# Patient Record
Sex: MALE | Race: WHITE | NOT HISPANIC OR LATINO | URBAN - METROPOLITAN AREA
[De-identification: names, ages, dates, MRNs, and addresses within clinical notes are randomized per-mention and may not be internally consistent; named-entity substitution may affect disease eponyms.]

---

## 2017-08-07 ENCOUNTER — RECORDS - HEALTHEAST (OUTPATIENT)
Dept: LAB | Facility: CLINIC | Age: 82
End: 2017-08-07

## 2017-08-07 LAB
CHOLEST SERPL-MCNC: 141 MG/DL
FASTING STATUS PATIENT QL REPORTED: ABNORMAL
HDLC SERPL-MCNC: 29 MG/DL
LDLC SERPL CALC-MCNC: 87 MG/DL
TRIGL SERPL-MCNC: 123 MG/DL

## 2017-10-10 ENCOUNTER — RECORDS - HEALTHEAST (OUTPATIENT)
Dept: LAB | Facility: CLINIC | Age: 82
End: 2017-10-10

## 2017-10-10 LAB
CREAT SERPL-MCNC: 1.51 MG/DL (ref 0.7–1.3)
GFR SERPL CREATININE-BSD FRML MDRD: 44 ML/MIN/1.73M2

## 2018-02-09 ENCOUNTER — RECORDS - HEALTHEAST (OUTPATIENT)
Dept: LAB | Facility: CLINIC | Age: 83
End: 2018-02-09

## 2018-02-09 LAB
ALBUMIN SERPL-MCNC: 2.1 G/DL (ref 3.5–5)
ALP SERPL-CCNC: 97 U/L (ref 45–120)
ALT SERPL W P-5'-P-CCNC: <9 U/L (ref 0–45)
ANION GAP SERPL CALCULATED.3IONS-SCNC: 5 MMOL/L (ref 5–18)
AST SERPL W P-5'-P-CCNC: 10 U/L (ref 0–40)
BASOPHILS # BLD AUTO: 0 THOU/UL (ref 0–0.2)
BASOPHILS NFR BLD AUTO: 0 % (ref 0–2)
BILIRUB SERPL-MCNC: 0.3 MG/DL (ref 0–1)
BUN SERPL-MCNC: 39 MG/DL (ref 8–28)
CALCIUM SERPL-MCNC: 8 MG/DL (ref 8.5–10.5)
CHLORIDE BLD-SCNC: 106 MMOL/L (ref 98–107)
CHOLEST SERPL-MCNC: 116 MG/DL
CO2 SERPL-SCNC: 32 MMOL/L (ref 22–31)
CREAT SERPL-MCNC: 1.45 MG/DL (ref 0.7–1.3)
EOSINOPHIL # BLD AUTO: 0.6 THOU/UL (ref 0–0.4)
EOSINOPHIL NFR BLD AUTO: 6 % (ref 0–6)
ERYTHROCYTE [DISTWIDTH] IN BLOOD BY AUTOMATED COUNT: 12.7 % (ref 11–14.5)
FASTING STATUS PATIENT QL REPORTED: ABNORMAL
GFR SERPL CREATININE-BSD FRML MDRD: 46 ML/MIN/1.73M2
GLUCOSE BLD-MCNC: 101 MG/DL (ref 70–125)
HCT VFR BLD AUTO: 35.9 % (ref 40–54)
HDLC SERPL-MCNC: 27 MG/DL
HGB BLD-MCNC: 10.9 G/DL (ref 14–18)
LDLC SERPL CALC-MCNC: 70 MG/DL
LYMPHOCYTES # BLD AUTO: 3.3 THOU/UL (ref 0.8–4.4)
LYMPHOCYTES NFR BLD AUTO: 32 % (ref 20–40)
MCH RBC QN AUTO: 31.3 PG (ref 27–34)
MCHC RBC AUTO-ENTMCNC: 30.4 G/DL (ref 32–36)
MCV RBC AUTO: 103 FL (ref 80–100)
MONOCYTES # BLD AUTO: 0.6 THOU/UL (ref 0–0.9)
MONOCYTES NFR BLD AUTO: 6 % (ref 2–10)
NEUTROPHILS # BLD AUTO: 5.8 THOU/UL (ref 2–7.7)
NEUTROPHILS NFR BLD AUTO: 56 % (ref 50–70)
PLATELET # BLD AUTO: 213 THOU/UL (ref 140–440)
PMV BLD AUTO: 8.9 FL (ref 8.5–12.5)
POTASSIUM BLD-SCNC: 4.4 MMOL/L (ref 3.5–5)
PROT SERPL-MCNC: 5.1 G/DL (ref 6–8)
RBC # BLD AUTO: 3.48 MILL/UL (ref 4.4–6.2)
SODIUM SERPL-SCNC: 143 MMOL/L (ref 136–145)
TRIGL SERPL-MCNC: 93 MG/DL
TSH SERPL DL<=0.005 MIU/L-ACNC: 5.22 UIU/ML (ref 0.3–5)
WBC: 10.4 THOU/UL (ref 4–11)

## 2018-08-01 ENCOUNTER — RECORDS - HEALTHEAST (OUTPATIENT)
Dept: LAB | Facility: CLINIC | Age: 83
End: 2018-08-01

## 2018-08-02 LAB
ALBUMIN SERPL-MCNC: 2.5 G/DL (ref 3.5–5)
ALP SERPL-CCNC: 112 U/L (ref 45–120)
ALT SERPL W P-5'-P-CCNC: 12 U/L (ref 0–45)
ANION GAP SERPL CALCULATED.3IONS-SCNC: 7 MMOL/L (ref 5–18)
AST SERPL W P-5'-P-CCNC: 14 U/L (ref 0–40)
BASOPHILS # BLD AUTO: 0.1 THOU/UL (ref 0–0.2)
BASOPHILS NFR BLD AUTO: 1 % (ref 0–2)
BILIRUB SERPL-MCNC: 0.3 MG/DL (ref 0–1)
BUN SERPL-MCNC: 54 MG/DL (ref 8–28)
CALCIUM SERPL-MCNC: 8.2 MG/DL (ref 8.5–10.5)
CHLORIDE BLD-SCNC: 102 MMOL/L (ref 98–107)
CHOLEST SERPL-MCNC: 175 MG/DL
CO2 SERPL-SCNC: 30 MMOL/L (ref 22–31)
CREAT SERPL-MCNC: 1.78 MG/DL (ref 0.7–1.3)
EOSINOPHIL # BLD AUTO: 0.7 THOU/UL (ref 0–0.4)
EOSINOPHIL NFR BLD AUTO: 5 % (ref 0–6)
ERYTHROCYTE [DISTWIDTH] IN BLOOD BY AUTOMATED COUNT: 12.6 % (ref 11–14.5)
FASTING STATUS PATIENT QL REPORTED: ABNORMAL
GFR SERPL CREATININE-BSD FRML MDRD: 36 ML/MIN/1.73M2
GLUCOSE BLD-MCNC: 178 MG/DL (ref 70–125)
HCT VFR BLD AUTO: 39.3 % (ref 40–54)
HDLC SERPL-MCNC: 34 MG/DL
HGB BLD-MCNC: 12.1 G/DL (ref 14–18)
LDLC SERPL CALC-MCNC: 114 MG/DL
LYMPHOCYTES # BLD AUTO: 4.5 THOU/UL (ref 0.8–4.4)
LYMPHOCYTES NFR BLD AUTO: 33 % (ref 20–40)
MCH RBC QN AUTO: 31.7 PG (ref 27–34)
MCHC RBC AUTO-ENTMCNC: 30.8 G/DL (ref 32–36)
MCV RBC AUTO: 103 FL (ref 80–100)
MONOCYTES # BLD AUTO: 0.5 THOU/UL (ref 0–0.9)
MONOCYTES NFR BLD AUTO: 4 % (ref 2–10)
NEUTROPHILS # BLD AUTO: 7.7 THOU/UL (ref 2–7.7)
NEUTROPHILS NFR BLD AUTO: 58 % (ref 50–70)
PLATELET # BLD AUTO: 259 THOU/UL (ref 140–440)
PMV BLD AUTO: 9.1 FL (ref 8.5–12.5)
POTASSIUM BLD-SCNC: 4.5 MMOL/L (ref 3.5–5)
PROT SERPL-MCNC: 5.8 G/DL (ref 6–8)
RBC # BLD AUTO: 3.82 MILL/UL (ref 4.4–6.2)
SODIUM SERPL-SCNC: 139 MMOL/L (ref 136–145)
TRIGL SERPL-MCNC: 133 MG/DL
TSH SERPL DL<=0.005 MIU/L-ACNC: 6.43 UIU/ML (ref 0.3–5)
WBC: 13.4 THOU/UL (ref 4–11)

## 2018-08-07 ENCOUNTER — RECORDS - HEALTHEAST (OUTPATIENT)
Dept: LAB | Facility: CLINIC | Age: 83
End: 2018-08-07

## 2018-08-08 LAB
ALBUMIN SERPL-MCNC: 2.5 G/DL (ref 3.5–5)
ALP SERPL-CCNC: 112 U/L (ref 45–120)
ALT SERPL W P-5'-P-CCNC: 12 U/L (ref 0–45)
ANION GAP SERPL CALCULATED.3IONS-SCNC: 12 MMOL/L (ref 5–18)
AST SERPL W P-5'-P-CCNC: 11 U/L (ref 0–40)
BASOPHILS # BLD AUTO: 0.1 THOU/UL (ref 0–0.2)
BASOPHILS NFR BLD AUTO: 1 % (ref 0–2)
BILIRUB SERPL-MCNC: 0.3 MG/DL (ref 0–1)
BUN SERPL-MCNC: 53 MG/DL (ref 8–28)
CALCIUM SERPL-MCNC: 7.9 MG/DL (ref 8.5–10.5)
CHLORIDE BLD-SCNC: 105 MMOL/L (ref 98–107)
CHOLEST SERPL-MCNC: 180 MG/DL
CO2 SERPL-SCNC: 24 MMOL/L (ref 22–31)
CREAT SERPL-MCNC: 1.65 MG/DL (ref 0.7–1.3)
EOSINOPHIL # BLD AUTO: 0.6 THOU/UL (ref 0–0.4)
EOSINOPHIL NFR BLD AUTO: 5 % (ref 0–6)
ERYTHROCYTE [DISTWIDTH] IN BLOOD BY AUTOMATED COUNT: 12.9 % (ref 11–14.5)
FASTING STATUS PATIENT QL REPORTED: ABNORMAL
FERRITIN SERPL-MCNC: 101 NG/ML (ref 27–300)
GFR SERPL CREATININE-BSD FRML MDRD: 39 ML/MIN/1.73M2
GLUCOSE BLD-MCNC: 174 MG/DL (ref 70–125)
HCT VFR BLD AUTO: 38 % (ref 40–54)
HDLC SERPL-MCNC: 34 MG/DL
HGB BLD-MCNC: 12.1 G/DL (ref 14–18)
LDLC SERPL CALC-MCNC: 121 MG/DL
LYMPHOCYTES # BLD AUTO: 3.4 THOU/UL (ref 0.8–4.4)
LYMPHOCYTES NFR BLD AUTO: 28 % (ref 20–40)
MCH RBC QN AUTO: 32.7 PG (ref 27–34)
MCHC RBC AUTO-ENTMCNC: 31.8 G/DL (ref 32–36)
MCV RBC AUTO: 103 FL (ref 80–100)
MONOCYTES # BLD AUTO: 0.5 THOU/UL (ref 0–0.9)
MONOCYTES NFR BLD AUTO: 4 % (ref 2–10)
NEUTROPHILS # BLD AUTO: 7.4 THOU/UL (ref 2–7.7)
NEUTROPHILS NFR BLD AUTO: 62 % (ref 50–70)
PLATELET # BLD AUTO: 203 THOU/UL (ref 140–440)
PMV BLD AUTO: 10.8 FL (ref 8.5–12.5)
POTASSIUM BLD-SCNC: 4.4 MMOL/L (ref 3.5–5)
PROT SERPL-MCNC: 5.3 G/DL (ref 6–8)
RBC # BLD AUTO: 3.7 MILL/UL (ref 4.4–6.2)
SODIUM SERPL-SCNC: 141 MMOL/L (ref 136–145)
TRIGL SERPL-MCNC: 123 MG/DL
TSH SERPL DL<=0.005 MIU/L-ACNC: 6.53 UIU/ML (ref 0.3–5)
WBC: 12 THOU/UL (ref 4–11)

## 2018-08-15 ENCOUNTER — RECORDS - HEALTHEAST (OUTPATIENT)
Dept: LAB | Facility: CLINIC | Age: 83
End: 2018-08-15

## 2018-08-16 LAB — TSH SERPL DL<=0.005 MIU/L-ACNC: 5.45 UIU/ML (ref 0.3–5)

## 2018-08-20 ENCOUNTER — RECORDS - HEALTHEAST (OUTPATIENT)
Dept: LAB | Facility: CLINIC | Age: 83
End: 2018-08-20

## 2018-08-21 ENCOUNTER — RECORDS - HEALTHEAST (OUTPATIENT)
Dept: LAB | Facility: CLINIC | Age: 83
End: 2018-08-21

## 2018-08-21 LAB
ALBUMIN SERPL-MCNC: 2.5 G/DL (ref 3.5–5)
ANION GAP SERPL CALCULATED.3IONS-SCNC: 6 MMOL/L (ref 5–18)
BUN SERPL-MCNC: 40 MG/DL (ref 8–28)
CALCIUM SERPL-MCNC: 8.7 MG/DL (ref 8.5–10.5)
CHLORIDE BLD-SCNC: 102 MMOL/L (ref 98–107)
CO2 SERPL-SCNC: 33 MMOL/L (ref 22–31)
CREAT SERPL-MCNC: 1.71 MG/DL (ref 0.7–1.3)
GFR SERPL CREATININE-BSD FRML MDRD: 38 ML/MIN/1.73M2
GLUCOSE BLD-MCNC: 113 MG/DL (ref 70–125)
HBA1C MFR BLD: 5.9 % (ref 4.2–6.1)
PHOSPHATE SERPL-MCNC: 3 MG/DL (ref 2.5–4.5)
POTASSIUM BLD-SCNC: 4.5 MMOL/L (ref 3.5–5)
SODIUM SERPL-SCNC: 141 MMOL/L (ref 136–145)
THYROID PEROXIDASE ANTIBODIES - HISTORICAL: <3 IU/ML (ref 0–5.6)
URATE SERPL-MCNC: 2.8 MG/DL (ref 3–8)

## 2018-09-04 ENCOUNTER — RECORDS - HEALTHEAST (OUTPATIENT)
Dept: LAB | Facility: CLINIC | Age: 83
End: 2018-09-04

## 2018-09-04 LAB
T3 SERPL-MCNC: 47 NG/DL (ref 45–175)
T3FREE SERPL-MCNC: 1.5 PG/ML (ref 1.9–3.9)
T4 FREE SERPL-MCNC: 0.8 NG/DL (ref 0.7–1.8)
TSH SERPL DL<=0.005 MIU/L-ACNC: 6.5 UIU/ML (ref 0.3–5)

## 2018-10-29 ENCOUNTER — HOME CARE/HOSPICE - HEALTHEAST (OUTPATIENT)
Dept: HOSPICE | Facility: HOSPICE | Age: 83
End: 2018-10-29

## 2018-10-30 ENCOUNTER — AMBULATORY - HEALTHEAST (OUTPATIENT)
Dept: OTHER | Facility: CLINIC | Age: 83
End: 2018-10-30

## 2018-10-31 ENCOUNTER — RECORDS - HEALTHEAST (OUTPATIENT)
Dept: ADMINISTRATIVE | Facility: OTHER | Age: 83
End: 2018-10-31

## 2018-10-31 ENCOUNTER — HOME CARE/HOSPICE - HEALTHEAST (OUTPATIENT)
Dept: HOSPICE | Facility: HOSPICE | Age: 83
End: 2018-10-31

## 2018-11-01 ENCOUNTER — HOME CARE/HOSPICE - HEALTHEAST (OUTPATIENT)
Dept: HOSPICE | Facility: HOSPICE | Age: 83
End: 2018-11-01

## 2018-11-01 ENCOUNTER — AMBULATORY - HEALTHEAST (OUTPATIENT)
Dept: HOSPICE | Facility: HOSPICE | Age: 83
End: 2018-11-01

## 2018-11-01 ASSESSMENT — MIFFLIN-ST. JEOR: SCORE: 1782.98

## 2018-11-02 ENCOUNTER — HOME CARE/HOSPICE - HEALTHEAST (OUTPATIENT)
Dept: HOSPICE | Facility: HOSPICE | Age: 83
End: 2018-11-02

## 2018-11-05 ENCOUNTER — HOME CARE/HOSPICE - HEALTHEAST (OUTPATIENT)
Dept: HOSPICE | Facility: HOSPICE | Age: 83
End: 2018-11-05

## 2018-11-06 ENCOUNTER — HOME CARE/HOSPICE - HEALTHEAST (OUTPATIENT)
Dept: HOSPICE | Facility: HOSPICE | Age: 83
End: 2018-11-06

## 2018-11-07 ENCOUNTER — HOME CARE/HOSPICE - HEALTHEAST (OUTPATIENT)
Dept: HOSPICE | Facility: HOSPICE | Age: 83
End: 2018-11-07

## 2018-11-08 ENCOUNTER — HOME CARE/HOSPICE - HEALTHEAST (OUTPATIENT)
Dept: HOSPICE | Facility: HOSPICE | Age: 83
End: 2018-11-08

## 2018-11-12 ENCOUNTER — HOME CARE/HOSPICE - HEALTHEAST (OUTPATIENT)
Dept: HOSPICE | Facility: HOSPICE | Age: 83
End: 2018-11-12

## 2018-11-13 ENCOUNTER — HOME CARE/HOSPICE - HEALTHEAST (OUTPATIENT)
Dept: HOSPICE | Facility: HOSPICE | Age: 83
End: 2018-11-13

## 2018-11-13 ENCOUNTER — AMBULATORY - HEALTHEAST (OUTPATIENT)
Dept: HOSPICE | Facility: HOSPICE | Age: 83
End: 2018-11-13

## 2018-11-13 ENCOUNTER — AMBULATORY - HEALTHEAST (OUTPATIENT)
Dept: FAMILY MEDICINE | Facility: CLINIC | Age: 83
End: 2018-11-13

## 2018-11-14 ENCOUNTER — RECORDS - HEALTHEAST (OUTPATIENT)
Dept: LAB | Facility: CLINIC | Age: 83
End: 2018-11-14

## 2018-11-14 ENCOUNTER — HOME CARE/HOSPICE - HEALTHEAST (OUTPATIENT)
Dept: HOSPICE | Facility: HOSPICE | Age: 83
End: 2018-11-14

## 2018-11-14 LAB — TSH SERPL DL<=0.005 MIU/L-ACNC: 1.78 UIU/ML (ref 0.3–5)

## 2018-11-15 ENCOUNTER — HOME CARE/HOSPICE - HEALTHEAST (OUTPATIENT)
Dept: HOSPICE | Facility: HOSPICE | Age: 83
End: 2018-11-15

## 2018-11-16 ENCOUNTER — HOME CARE/HOSPICE - HEALTHEAST (OUTPATIENT)
Dept: HOSPICE | Facility: HOSPICE | Age: 83
End: 2018-11-16

## 2018-11-19 ENCOUNTER — HOME CARE/HOSPICE - HEALTHEAST (OUTPATIENT)
Dept: HOSPICE | Facility: HOSPICE | Age: 83
End: 2018-11-19

## 2018-11-19 ENCOUNTER — AMBULATORY - HEALTHEAST (OUTPATIENT)
Dept: HOSPICE | Facility: HOSPICE | Age: 83
End: 2018-11-19

## 2018-11-20 ENCOUNTER — HOME CARE/HOSPICE - HEALTHEAST (OUTPATIENT)
Dept: HOSPICE | Facility: HOSPICE | Age: 83
End: 2018-11-20

## 2018-11-21 ENCOUNTER — HOME CARE/HOSPICE - HEALTHEAST (OUTPATIENT)
Dept: HOSPICE | Facility: HOSPICE | Age: 83
End: 2018-11-21

## 2018-11-26 ENCOUNTER — HOME CARE/HOSPICE - HEALTHEAST (OUTPATIENT)
Dept: HOSPICE | Facility: HOSPICE | Age: 83
End: 2018-11-26

## 2018-11-27 ENCOUNTER — AMBULATORY - HEALTHEAST (OUTPATIENT)
Dept: HOSPICE | Facility: HOSPICE | Age: 83
End: 2018-11-27

## 2018-11-28 ENCOUNTER — HOME CARE/HOSPICE - HEALTHEAST (OUTPATIENT)
Dept: HOSPICE | Facility: HOSPICE | Age: 83
End: 2018-11-28

## 2018-11-30 ENCOUNTER — HOME CARE/HOSPICE - HEALTHEAST (OUTPATIENT)
Dept: HOSPICE | Facility: HOSPICE | Age: 83
End: 2018-11-30

## 2018-12-03 ENCOUNTER — HOME CARE/HOSPICE - HEALTHEAST (OUTPATIENT)
Dept: HOSPICE | Facility: HOSPICE | Age: 83
End: 2018-12-03

## 2018-12-06 ENCOUNTER — HOME CARE/HOSPICE - HEALTHEAST (OUTPATIENT)
Dept: HOSPICE | Facility: HOSPICE | Age: 83
End: 2018-12-06

## 2018-12-11 ENCOUNTER — AMBULATORY - HEALTHEAST (OUTPATIENT)
Dept: HOSPICE | Facility: HOSPICE | Age: 83
End: 2018-12-11

## 2018-12-12 ENCOUNTER — HOME CARE/HOSPICE - HEALTHEAST (OUTPATIENT)
Dept: HOSPICE | Facility: HOSPICE | Age: 83
End: 2018-12-12

## 2018-12-14 ENCOUNTER — HOME CARE/HOSPICE - HEALTHEAST (OUTPATIENT)
Dept: HOSPICE | Facility: HOSPICE | Age: 83
End: 2018-12-14

## 2018-12-16 ENCOUNTER — HOME CARE/HOSPICE - HEALTHEAST (OUTPATIENT)
Dept: HOSPICE | Facility: HOSPICE | Age: 83
End: 2018-12-16

## 2018-12-18 ENCOUNTER — HOME CARE/HOSPICE - HEALTHEAST (OUTPATIENT)
Dept: HOSPICE | Facility: HOSPICE | Age: 83
End: 2018-12-18

## 2018-12-19 ENCOUNTER — HOME CARE/HOSPICE - HEALTHEAST (OUTPATIENT)
Dept: HOSPICE | Facility: HOSPICE | Age: 83
End: 2018-12-19

## 2018-12-26 ENCOUNTER — AMBULATORY - HEALTHEAST (OUTPATIENT)
Dept: HOSPICE | Facility: HOSPICE | Age: 83
End: 2018-12-26

## 2018-12-26 ENCOUNTER — HOME CARE/HOSPICE - HEALTHEAST (OUTPATIENT)
Dept: HOSPICE | Facility: HOSPICE | Age: 83
End: 2018-12-26

## 2018-12-27 ENCOUNTER — HOME CARE/HOSPICE - HEALTHEAST (OUTPATIENT)
Dept: HOSPICE | Facility: HOSPICE | Age: 83
End: 2018-12-27

## 2019-01-01 ENCOUNTER — HOME CARE/HOSPICE - HEALTHEAST (OUTPATIENT)
Dept: HOSPICE | Facility: HOSPICE | Age: 84
End: 2019-01-01

## 2019-01-01 ENCOUNTER — AMBULATORY - HEALTHEAST (OUTPATIENT)
Dept: HOSPICE | Facility: HOSPICE | Age: 84
End: 2019-01-01

## 2019-01-01 ENCOUNTER — RECORDS - HEALTHEAST (OUTPATIENT)
Dept: ADMINISTRATIVE | Facility: OTHER | Age: 84
End: 2019-01-01

## 2019-01-01 ENCOUNTER — OFFICE VISIT - HEALTHEAST (OUTPATIENT)
Dept: GERIATRICS | Facility: CLINIC | Age: 84
End: 2019-01-01

## 2019-01-01 ENCOUNTER — AMBULATORY - HEALTHEAST (OUTPATIENT)
Dept: OTHER | Facility: CLINIC | Age: 84
End: 2019-01-01

## 2019-01-01 ENCOUNTER — RECORDS - HEALTHEAST (OUTPATIENT)
Dept: LAB | Facility: CLINIC | Age: 84
End: 2019-01-01

## 2019-01-01 ENCOUNTER — AMBULATORY - HEALTHEAST (OUTPATIENT)
Dept: ADMINISTRATIVE | Facility: CLINIC | Age: 84
End: 2019-01-01

## 2019-01-01 DIAGNOSIS — J44.9 CHRONIC OBSTRUCTIVE PULMONARY DISEASE, UNSPECIFIED COPD TYPE (H): ICD-10-CM

## 2019-01-01 DIAGNOSIS — I10 ESSENTIAL HYPERTENSION: ICD-10-CM

## 2019-01-01 DIAGNOSIS — I50.9 CONGESTIVE HEART FAILURE, UNSPECIFIED HF CHRONICITY, UNSPECIFIED HEART FAILURE TYPE (H): ICD-10-CM

## 2019-01-01 DIAGNOSIS — J96.22 ACUTE ON CHRONIC RESPIRATORY FAILURE WITH HYPOXIA AND HYPERCAPNIA (H): ICD-10-CM

## 2019-01-01 DIAGNOSIS — J96.21 ACUTE ON CHRONIC RESPIRATORY FAILURE WITH HYPOXIA AND HYPERCAPNIA (H): ICD-10-CM

## 2019-01-01 LAB
ALBUMIN SERPL-MCNC: 2.4 G/DL (ref 3.5–5)
ALBUMIN SERPL-MCNC: 2.7 G/DL (ref 3.5–5)
ALP SERPL-CCNC: 111 U/L (ref 45–120)
ALP SERPL-CCNC: 113 U/L (ref 45–120)
ALT SERPL W P-5'-P-CCNC: 9 U/L (ref 0–45)
ALT SERPL W P-5'-P-CCNC: <9 U/L (ref 0–45)
ANION GAP SERPL CALCULATED.3IONS-SCNC: 7 MMOL/L (ref 5–18)
ANION GAP SERPL CALCULATED.3IONS-SCNC: 9 MMOL/L (ref 5–18)
AST SERPL W P-5'-P-CCNC: 13 U/L (ref 0–40)
AST SERPL W P-5'-P-CCNC: 13 U/L (ref 0–40)
BASOPHILS # BLD AUTO: 0.1 THOU/UL (ref 0–0.2)
BASOPHILS NFR BLD AUTO: 1 % (ref 0–2)
BILIRUB SERPL-MCNC: 0.2 MG/DL (ref 0–1)
BILIRUB SERPL-MCNC: 0.3 MG/DL (ref 0–1)
BUN SERPL-MCNC: 43 MG/DL (ref 8–28)
BUN SERPL-MCNC: 51 MG/DL (ref 8–28)
CALCIUM SERPL-MCNC: 8.2 MG/DL (ref 8.5–10.5)
CALCIUM SERPL-MCNC: 8.6 MG/DL (ref 8.5–10.5)
CHLORIDE BLD-SCNC: 104 MMOL/L (ref 98–107)
CHLORIDE BLD-SCNC: 105 MMOL/L (ref 98–107)
CO2 SERPL-SCNC: 27 MMOL/L (ref 22–31)
CO2 SERPL-SCNC: 29 MMOL/L (ref 22–31)
CREAT SERPL-MCNC: 1.73 MG/DL (ref 0.7–1.3)
CREAT SERPL-MCNC: 2.13 MG/DL (ref 0.7–1.3)
EOSINOPHIL # BLD AUTO: 0.5 THOU/UL (ref 0–0.4)
EOSINOPHIL # BLD AUTO: 0.6 THOU/UL (ref 0–0.4)
EOSINOPHIL # BLD AUTO: 0.6 THOU/UL (ref 0–0.4)
EOSINOPHIL NFR BLD AUTO: 5 % (ref 0–6)
EOSINOPHIL NFR BLD AUTO: 5 % (ref 0–6)
EOSINOPHIL NFR BLD AUTO: 6 % (ref 0–6)
ERYTHROCYTE [DISTWIDTH] IN BLOOD BY AUTOMATED COUNT: 12.2 % (ref 11–14.5)
ERYTHROCYTE [DISTWIDTH] IN BLOOD BY AUTOMATED COUNT: 12.2 % (ref 11–14.5)
ERYTHROCYTE [DISTWIDTH] IN BLOOD BY AUTOMATED COUNT: 12.3 % (ref 11–14.5)
FERRITIN SERPL-MCNC: 104 NG/ML (ref 27–300)
FERRITIN SERPL-MCNC: 84 NG/ML (ref 27–300)
GFR SERPL CREATININE-BSD FRML MDRD: 29 ML/MIN/1.73M2
GFR SERPL CREATININE-BSD FRML MDRD: 37 ML/MIN/1.73M2
GLUCOSE BLD-MCNC: 125 MG/DL (ref 70–125)
GLUCOSE BLD-MCNC: 78 MG/DL (ref 70–125)
HCT VFR BLD AUTO: 33.3 % (ref 40–54)
HCT VFR BLD AUTO: 34.2 % (ref 40–54)
HCT VFR BLD AUTO: 35.4 % (ref 40–54)
HGB BLD-MCNC: 10.3 G/DL (ref 14–18)
HGB BLD-MCNC: 10.6 G/DL (ref 14–18)
HGB BLD-MCNC: 10.8 G/DL (ref 14–18)
HGB BLD-MCNC: 10.9 G/DL (ref 14–18)
HGB BLD-MCNC: 9.7 G/DL (ref 14–18)
IRON SATN MFR SERPL: 23 % (ref 20–50)
IRON SATN MFR SERPL: 25 % (ref 20–50)
IRON SERPL-MCNC: 49 UG/DL (ref 42–175)
IRON SERPL-MCNC: 59 UG/DL (ref 42–175)
LYMPHOCYTES # BLD AUTO: 3.2 THOU/UL (ref 0.8–4.4)
LYMPHOCYTES # BLD AUTO: 3.6 THOU/UL (ref 0.8–4.4)
LYMPHOCYTES # BLD AUTO: 3.8 THOU/UL (ref 0.8–4.4)
LYMPHOCYTES NFR BLD AUTO: 32 % (ref 20–40)
LYMPHOCYTES NFR BLD AUTO: 34 % (ref 20–40)
LYMPHOCYTES NFR BLD AUTO: 35 % (ref 20–40)
MCH RBC QN AUTO: 31 PG (ref 27–34)
MCH RBC QN AUTO: 31.4 PG (ref 27–34)
MCH RBC QN AUTO: 31.9 PG (ref 27–34)
MCHC RBC AUTO-ENTMCNC: 30.5 G/DL (ref 32–36)
MCHC RBC AUTO-ENTMCNC: 30.9 G/DL (ref 32–36)
MCHC RBC AUTO-ENTMCNC: 31 G/DL (ref 32–36)
MCV RBC AUTO: 100 FL (ref 80–100)
MCV RBC AUTO: 102 FL (ref 80–100)
MCV RBC AUTO: 104 FL (ref 80–100)
MONOCYTES # BLD AUTO: 0.6 THOU/UL (ref 0–0.9)
MONOCYTES NFR BLD AUTO: 5 % (ref 2–10)
MONOCYTES NFR BLD AUTO: 6 % (ref 2–10)
MONOCYTES NFR BLD AUTO: 6 % (ref 2–10)
NEUTROPHILS # BLD AUTO: 5.6 THOU/UL (ref 2–7.7)
NEUTROPHILS # BLD AUTO: 5.7 THOU/UL (ref 2–7.7)
NEUTROPHILS # BLD AUTO: 6 THOU/UL (ref 2–7.7)
NEUTROPHILS NFR BLD AUTO: 54 % (ref 50–70)
NEUTROPHILS NFR BLD AUTO: 55 % (ref 50–70)
NEUTROPHILS NFR BLD AUTO: 55 % (ref 50–70)
PLATELET # BLD AUTO: 212 THOU/UL (ref 140–440)
PLATELET # BLD AUTO: 217 THOU/UL (ref 140–440)
PLATELET # BLD AUTO: 235 THOU/UL (ref 140–440)
PMV BLD AUTO: 8.9 FL (ref 8.5–12.5)
PMV BLD AUTO: 8.9 FL (ref 8.5–12.5)
PMV BLD AUTO: 9.2 FL (ref 8.5–12.5)
POTASSIUM BLD-SCNC: 4.3 MMOL/L (ref 3.5–5)
POTASSIUM BLD-SCNC: 4.7 MMOL/L (ref 3.5–5)
PROT SERPL-MCNC: 5.6 G/DL (ref 6–8)
PROT SERPL-MCNC: 5.8 G/DL (ref 6–8)
RBC # BLD AUTO: 3.28 MILL/UL (ref 4.4–6.2)
RBC # BLD AUTO: 3.39 MILL/UL (ref 4.4–6.2)
RBC # BLD AUTO: 3.42 MILL/UL (ref 4.4–6.2)
SODIUM SERPL-SCNC: 140 MMOL/L (ref 136–145)
SODIUM SERPL-SCNC: 141 MMOL/L (ref 136–145)
TIBC SERPL-MCNC: 216 UG/DL (ref 313–563)
TIBC SERPL-MCNC: 235 UG/DL (ref 313–563)
TRANSFERRIN SERPL-MCNC: 173 MG/DL (ref 212–360)
TRANSFERRIN SERPL-MCNC: 188 MG/DL (ref 212–360)
TSH SERPL DL<=0.005 MIU/L-ACNC: 1.99 UIU/ML (ref 0.3–5)
TSH SERPL DL<=0.005 MIU/L-ACNC: 3.01 UIU/ML (ref 0.3–5)
VIT B12 SERPL-MCNC: 314 PG/ML (ref 213–816)
VIT B12 SERPL-MCNC: 372 PG/ML (ref 213–816)
WBC: 10.1 THOU/UL (ref 4–11)
WBC: 10.5 THOU/UL (ref 4–11)
WBC: 11 THOU/UL (ref 4–11)

## 2019-01-01 RX ORDER — POLYETHYLENE GLYCOL 3350 17 G/17G
17 POWDER, FOR SOLUTION ORAL EVERY OTHER DAY
Status: SHIPPED | COMMUNITY
Start: 2019-01-01

## 2019-01-03 ENCOUNTER — HOME CARE/HOSPICE - HEALTHEAST (OUTPATIENT)
Dept: HOSPICE | Facility: HOSPICE | Age: 84
End: 2019-01-03

## 2019-01-04 ENCOUNTER — HOME CARE/HOSPICE - HEALTHEAST (OUTPATIENT)
Dept: HOSPICE | Facility: HOSPICE | Age: 84
End: 2019-01-04

## 2019-01-08 ENCOUNTER — AMBULATORY - HEALTHEAST (OUTPATIENT)
Dept: HOSPICE | Facility: HOSPICE | Age: 84
End: 2019-01-08

## 2019-01-09 ENCOUNTER — HOME CARE/HOSPICE - HEALTHEAST (OUTPATIENT)
Dept: HOSPICE | Facility: HOSPICE | Age: 84
End: 2019-01-09

## 2019-01-15 ENCOUNTER — HOME CARE/HOSPICE - HEALTHEAST (OUTPATIENT)
Dept: HOSPICE | Facility: HOSPICE | Age: 84
End: 2019-01-15

## 2019-01-16 ENCOUNTER — HOME CARE/HOSPICE - HEALTHEAST (OUTPATIENT)
Dept: HOSPICE | Facility: HOSPICE | Age: 84
End: 2019-01-16

## 2019-01-22 ENCOUNTER — AMBULATORY - HEALTHEAST (OUTPATIENT)
Dept: HOSPICE | Facility: HOSPICE | Age: 84
End: 2019-01-22

## 2019-01-23 ENCOUNTER — HOME CARE/HOSPICE - HEALTHEAST (OUTPATIENT)
Dept: HOSPICE | Facility: HOSPICE | Age: 84
End: 2019-01-23

## 2019-01-30 ENCOUNTER — HOME CARE/HOSPICE - HEALTHEAST (OUTPATIENT)
Dept: HOSPICE | Facility: HOSPICE | Age: 84
End: 2019-01-30

## 2019-01-31 ENCOUNTER — HOME CARE/HOSPICE - HEALTHEAST (OUTPATIENT)
Dept: HOSPICE | Facility: HOSPICE | Age: 84
End: 2019-01-31

## 2019-02-05 ENCOUNTER — AMBULATORY - HEALTHEAST (OUTPATIENT)
Dept: HOSPICE | Facility: HOSPICE | Age: 84
End: 2019-02-05

## 2019-02-06 ENCOUNTER — HOME CARE/HOSPICE - HEALTHEAST (OUTPATIENT)
Dept: HOSPICE | Facility: HOSPICE | Age: 84
End: 2019-02-06

## 2019-02-13 ENCOUNTER — HOME CARE/HOSPICE - HEALTHEAST (OUTPATIENT)
Dept: HOSPICE | Facility: HOSPICE | Age: 84
End: 2019-02-13

## 2020-01-01 ENCOUNTER — HOME CARE/HOSPICE - HEALTHEAST (OUTPATIENT)
Dept: HOSPICE | Facility: HOSPICE | Age: 85
End: 2020-01-01

## 2020-01-01 ENCOUNTER — OFFICE VISIT - HEALTHEAST (OUTPATIENT)
Dept: GERIATRICS | Facility: CLINIC | Age: 85
End: 2020-01-01

## 2020-01-01 ENCOUNTER — AMBULATORY - HEALTHEAST (OUTPATIENT)
Dept: GERIATRICS | Facility: CLINIC | Age: 85
End: 2020-01-01

## 2020-01-01 DIAGNOSIS — J44.9 CHRONIC OBSTRUCTIVE PULMONARY DISEASE, UNSPECIFIED COPD TYPE (H): ICD-10-CM

## 2020-01-01 DIAGNOSIS — I10 ESSENTIAL HYPERTENSION: ICD-10-CM

## 2020-01-01 DIAGNOSIS — I50.9 CONGESTIVE HEART FAILURE, UNSPECIFIED HF CHRONICITY, UNSPECIFIED HEART FAILURE TYPE (H): ICD-10-CM

## 2021-05-24 ENCOUNTER — RECORDS - HEALTHEAST (OUTPATIENT)
Dept: ADMINISTRATIVE | Facility: CLINIC | Age: 86
End: 2021-05-24

## 2021-05-26 ENCOUNTER — RECORDS - HEALTHEAST (OUTPATIENT)
Dept: ADMINISTRATIVE | Facility: CLINIC | Age: 86
End: 2021-05-26

## 2021-05-26 VITALS
HEART RATE: 64 BPM | SYSTOLIC BLOOD PRESSURE: 140 MMHG | DIASTOLIC BLOOD PRESSURE: 64 MMHG | OXYGEN SATURATION: 92 % | RESPIRATION RATE: 16 BRPM

## 2021-05-26 VITALS
DIASTOLIC BLOOD PRESSURE: 49 MMHG | RESPIRATION RATE: 20 BRPM | HEART RATE: 70 BPM | OXYGEN SATURATION: 97 % | SYSTOLIC BLOOD PRESSURE: 107 MMHG

## 2021-05-26 VITALS
HEART RATE: 68 BPM | DIASTOLIC BLOOD PRESSURE: 88 MMHG | SYSTOLIC BLOOD PRESSURE: 160 MMHG | RESPIRATION RATE: 18 BRPM | OXYGEN SATURATION: 97 %

## 2021-05-26 VITALS
HEART RATE: 64 BPM | OXYGEN SATURATION: 93 % | SYSTOLIC BLOOD PRESSURE: 130 MMHG | DIASTOLIC BLOOD PRESSURE: 64 MMHG | RESPIRATION RATE: 16 BRPM

## 2021-05-26 VITALS
DIASTOLIC BLOOD PRESSURE: 63 MMHG | SYSTOLIC BLOOD PRESSURE: 121 MMHG | OXYGEN SATURATION: 98 % | RESPIRATION RATE: 18 BRPM | HEART RATE: 64 BPM

## 2021-05-26 VITALS
HEART RATE: 80 BPM | DIASTOLIC BLOOD PRESSURE: 68 MMHG | RESPIRATION RATE: 16 BRPM | SYSTOLIC BLOOD PRESSURE: 128 MMHG | OXYGEN SATURATION: 95 %

## 2021-05-26 VITALS
DIASTOLIC BLOOD PRESSURE: 66 MMHG | SYSTOLIC BLOOD PRESSURE: 144 MMHG | RESPIRATION RATE: 16 BRPM | OXYGEN SATURATION: 98 % | HEART RATE: 65 BPM

## 2021-05-26 VITALS
OXYGEN SATURATION: 94 % | HEART RATE: 64 BPM | RESPIRATION RATE: 20 BRPM | SYSTOLIC BLOOD PRESSURE: 152 MMHG | DIASTOLIC BLOOD PRESSURE: 74 MMHG

## 2021-05-26 VITALS
RESPIRATION RATE: 16 BRPM | OXYGEN SATURATION: 95 % | HEART RATE: 64 BPM | SYSTOLIC BLOOD PRESSURE: 158 MMHG | DIASTOLIC BLOOD PRESSURE: 66 MMHG

## 2021-05-26 VITALS
HEART RATE: 64 BPM | OXYGEN SATURATION: 97 % | RESPIRATION RATE: 20 BRPM | DIASTOLIC BLOOD PRESSURE: 72 MMHG | SYSTOLIC BLOOD PRESSURE: 120 MMHG

## 2021-05-26 VITALS
RESPIRATION RATE: 16 BRPM | DIASTOLIC BLOOD PRESSURE: 80 MMHG | HEART RATE: 72 BPM | SYSTOLIC BLOOD PRESSURE: 158 MMHG | OXYGEN SATURATION: 93 %

## 2021-05-26 VITALS
SYSTOLIC BLOOD PRESSURE: 126 MMHG | OXYGEN SATURATION: 97 % | HEART RATE: 76 BPM | DIASTOLIC BLOOD PRESSURE: 68 MMHG | RESPIRATION RATE: 16 BRPM

## 2021-05-26 VITALS
SYSTOLIC BLOOD PRESSURE: 148 MMHG | OXYGEN SATURATION: 95 % | DIASTOLIC BLOOD PRESSURE: 70 MMHG | HEART RATE: 64 BPM | RESPIRATION RATE: 16 BRPM

## 2021-05-27 NOTE — PROGRESS NOTES
HOSPICE NP FACE TO FACE VISIT ON 4/10/2019 AT [facility] IN [place], [place] with JUAN OLSEN-HOSPICE RN CASE MANAGER PRESENT:    Summary: Cameron Conteh is a  89 y.o.  male  on hospice for a primary diagnosis of aortic stenosis. The original referral to hospice was in October 2018 following a hospitalization for respiratory failure. Today represents the first face-to-face visit with this client.    Significant comorbidities/coexisting conditions include:  Patient Active Problem List   Diagnosis     CAD (coronary artery disease)     Stage 3 chronic kidney disease (H)     Anemia     Hyperlipidemia     COPD (chronic obstructive pulmonary disease) (H)     CHF (congestive heart failure) (H)     Essential hypertension     Venous stasis ulcers of both lower extremities (H)     Pressure ulcer, sacrum     Morbid obesity (H)     Dementia without behavioral disturbance     Sepsis (H)     Acute gangrenous cholecystitis     Upper GI bleed     Aortic stenosis --moderate on Echo 1/2016     Leukocytosis     UGI bleed     Respiratory failure (H)     Hyperkalemia     LÓPEZ (acute kidney injury) (H)     Myoclonic jerking     Macrocytosis     Acute hypercapnic respiratory failure (H)     Pneumonia due to infectious organism, unspecified laterality, unspecified part of lung     NORA (obstructive sleep apnea)     Aspiration pneumonia of left lower lobe due to gastric secretions (H)     Bradycardia     Past Surgical History:   Procedure Laterality Date     APPENDECTOMY       CORONARY ANGIOPLASTY WITH STENT PLACEMENT  2005     ESOPHAGOGASTRODUODENOSCOPY N/A 8/27/2016    Procedure: ESOPHAGOGASTRODUODENOSCOPY;  Surgeon: Saul Garza MD;  Location: [facility] GI;  Service:      INGUINAL HERNIA REPAIR       LAPAROSCOPIC CHOLECYSTECTOMY N/A 2/24/2016    Procedure: CHOLECYSTECTOMY LAPAROSCOPIC;  Surgeon: Levi Louis MD;  Location: [facility] OR;  Service:      lumbar disc         Current Outpatient  Medications   Medication Sig Dispense Refill     acetaminophen (TYLENOL) 500 MG tablet Take 500 mg by mouth every 4 (four) hours as needed for pain.       amino ac-protein hydr-whey pro (PROSOURCE)  gram-kcal/30 mL Liqd Take 1 oz by mouth 2 (two) times a day.       atropine 1 % ophthalmic solution 1-2 drops orally or sublingually every 4 hours as needed for secretions. 1.25 mL 0     bisacodyl (DULCOLAX) 10 mg suppository Remove foil and insert 1 suppository daily, rectally, as needed for bowel movement. 1 suppository 0     bumetanide (BUMEX) 1 MG tablet Take 1 mg by mouth 2 (two) times a day at 9am and 6pm.       febuxostat (ULORIC) 40 mg tablet Take 40 mg by mouth daily.       ferrous sulfate 325 (65 FE) MG tablet Take 1 tablet by mouth 2 (two) times a day with meals.        hydrALAZINE (APRESOLINE) 25 MG tablet Take 1 tablet (25 mg total) by mouth every 12 (twelve) hours.  0     HYDROmorphone (DILAUDID) 0.5 MG solutab Place 1 tablet (0.5 mg total) under the tongue every 4 (four) hours as needed for pain or dyspnea (for pain, shortness of breath, or as directed by hospice nurse. May give orally or sublingually.). 5 tablet 0     levETIRAcetam (KEPPRA) 250 MG tablet Take 1 tablet (250 mg total) by mouth 2 (two) times a day. (Patient not taking: Reported on 10/31/2018)  0     levothyroxine (SYNTHROID, LEVOTHROID) 50 MCG tablet Take 50 mcg by mouth Daily at 6:00 am.       LORazepam (ATIVAN) 0.5 MG tablet Take 1 tablet (0.5 mg total) by mouth every 4 (four) hours as needed for anxiety (for anxiety.  May give orally or sublingually.). (Patient not taking: Reported on 12/16/2018) 5 tablet 0     nitroglycerin (NITROSTAT) 0.4 MG SL tablet Place 0.4 mg under the tongue every 5 (five) minutes as needed for chest pain (as directed needed for pain).       OXYGEN-AIR DELIVERY SYSTEMS MISC 2 L/min into each nostril continuous. Indications: dyspnea       sertraline (ZOLOFT) 50 MG tablet Take 50 mg by mouth daily.       white  petrolatum (AQUAPHOR ORIGINAL) 41 % Oint Apply 1 application topically 3 (three) times a day as needed.       No current facility-administered medications for this visit.      Functional status: Guilherme resides in a skilled nursing facility in [place], [place]. He is nonambulatory, nonweightbearing, incontinent of bowel and bladder, is total care, requires a yolanda lift for any transfers, and has difficulty articulating his needs due to dysarthria. Jimy is dependent on oxygen at 2 L, had been on Bipap or Cpap, but ultimately, he asked to discontinue it because of discomfort. He has dysphagia, is on a pureed type diet but refuses thickened liquids which were recommended. Cognitively, he is able to answer some questions but is a poor historian.     Current symptoms:   Fatigue  Sleepiness  Pain in the legs  Apneic spells  Peripheral edema  Shortness of breath    Changes in condition: Jimy has had a loss of muscle mass since start of care, apparently going from 38cm arm circumference to 26.5cm presently. He has also had a loss of 13# since start of care. He is having 20 sec apnea during sleep as noted today during visit.     Review of systems:   Constitutional: Positive for fatigue and weight loss.   Neurological: Negative.   Skin: Negative.  Respiratory: Positive for shortness of breath, requires oxygen.  Cardiovascular: Negative.   Gastrointestinal: Positive for dysphagia.   Musculoskeletal: Positive for muscle weakness, is nonambulatory.   Genitourinary: Negative.    Physical examination:     VS: /58, HR 66-75 irreg, RR 20, SaO2 88% on room air while sleeping, Right mid arm circumference 26.5cm (was 38cm at start of care). PPS 30%. Weight: 233# (was 246# at start of care).     General: Obese elderly man laying in hospital bed at nursing home, intermittently asleep. Oxygen flowing at 2 liters. Denies pain except for when his legs are touched.   Neurological: Difficult to rouse, unable to state the year,  oriented to the month, speech difficult to understand.  HEENT: Atraumatic, normocephalic, loose skin around the neck.   Skin: Pale, warm, dry.   Respiratory: Irregular, apneic periods noted of 20 sec, breath sounds somewhat coarse anteriorly.   Cardiovascular: Slightly irregular S1-S2 with a IV/VI MELITA.   Abdomen: Obese, soft, with active bowel sounds.   Extremities: Puffy, tender to palpation.     Jimy Conteh is a 89 year old man with aortic stenosis, sleep apnea, and COPD. Based on weight loss, muscle wasting and apneic spells, he appears to be declining.     Lashon Abebe, APRN/CNP  [facility]

## 2021-05-29 NOTE — PROGRESS NOTES
HOSPICE NP FACE TO FACE VISIT ON 6/5/2019 AT [facility] IN [place], [place] with JUAN OLSEN-HOSPICE RN CASE MANAGER PRESENT:    Summary: Guilherme Conteh is a  89 y.o.  male  on hospice for a primary diagnosis of aortic stenosis. The original referral to hospice was in October of 2018 following a hospitalization for respiratory failure. I last saw and examined him on 4/10/2019.      Significant comorbidities/coexisting conditions include:  Patient Active Problem List   Diagnosis     CAD (coronary artery disease)     Stage 3 chronic kidney disease (H)     Anemia     Hyperlipidemia     COPD (chronic obstructive pulmonary disease) (H)     CHF (congestive heart failure) (H)     Essential hypertension     Venous stasis ulcers of both lower extremities (H)     Pressure ulcer, sacrum     Morbid obesity (H)     Dementia without behavioral disturbance     Sepsis (H)     Acute gangrenous cholecystitis     Upper GI bleed     Aortic stenosis --moderate on Echo 1/2016     Leukocytosis     UGI bleed     Respiratory failure (H)     Hyperkalemia     LÓPEZ (acute kidney injury) (H)     Myoclonic jerking     Macrocytosis     Acute hypercapnic respiratory failure (H)     Pneumonia due to infectious organism, unspecified laterality, unspecified part of lung     NORA (obstructive sleep apnea)     Aspiration pneumonia of left lower lobe due to gastric secretions (H)     Bradycardia     Past Surgical History:   Procedure Laterality Date     APPENDECTOMY       CORONARY ANGIOPLASTY WITH STENT PLACEMENT  2005     ESOPHAGOGASTRODUODENOSCOPY N/A 8/27/2016    Procedure: ESOPHAGOGASTRODUODENOSCOPY;  Surgeon: Saul Garza MD;  Location: [facility];  Service:      INGUINAL HERNIA REPAIR       LAPAROSCOPIC CHOLECYSTECTOMY N/A 2/24/2016    Procedure: CHOLECYSTECTOMY LAPAROSCOPIC;  Surgeon: Levi Louis MD;  Location: [facility] OR;  Service:      lumbar disc         Current Outpatient Medications    Medication Sig Dispense Refill     acetaminophen (TYLENOL) 500 MG tablet Take 500 mg by mouth every 4 (four) hours as needed for pain.       amino ac-protein hydr-whey pro (PROSOURCE)  gram-kcal/30 mL Liqd Take 1 oz by mouth 2 (two) times a day.       atropine 1 % ophthalmic solution 1-2 drops orally or sublingually every 4 hours as needed for secretions. 1.25 mL 0     bisacodyl (DULCOLAX) 10 mg suppository Remove foil and insert 1 suppository daily, rectally, as needed for bowel movement. 1 suppository 0     bumetanide (BUMEX) 1 MG tablet Take 1 mg by mouth 2 (two) times a day at 9am and 6pm.       febuxostat (ULORIC) 40 mg tablet Take 40 mg by mouth daily.       ferrous sulfate 325 (65 FE) MG tablet Take 1 tablet by mouth 2 (two) times a day with meals.        hydrALAZINE (APRESOLINE) 25 MG tablet Take 1 tablet (25 mg total) by mouth every 12 (twelve) hours.  0     HYDROmorphone (DILAUDID) 0.5 MG solutab Place 1 tablet (0.5 mg total) under the tongue every 4 (four) hours as needed for pain or dyspnea (for pain, shortness of breath, or as directed by hospice nurse. May give orally or sublingually.). 5 tablet 0     levETIRAcetam (KEPPRA) 250 MG tablet Take 1 tablet (250 mg total) by mouth 2 (two) times a day. (Patient not taking: Reported on 10/31/2018)  0     levothyroxine (SYNTHROID, LEVOTHROID) 50 MCG tablet Take 50 mcg by mouth Daily at 6:00 am.       LORazepam (ATIVAN) 0.5 MG tablet Take 1 tablet (0.5 mg total) by mouth every 4 (four) hours as needed for anxiety (for anxiety.  May give orally or sublingually.). (Patient not taking: Reported on 12/16/2018) 5 tablet 0     menthol-zinc oxide (CALMOSEPTINE) 0.44-20.6 % Oint ointment Apply 1 application topically as needed (Excoriation). Indications: skin irritation       nitroglycerin (NITROSTAT) 0.4 MG SL tablet Place 0.4 mg under the tongue every 5 (five) minutes as needed for chest pain (as directed needed for pain).       OXYGEN-AIR DELIVERY SYSTEMS  MISC 2 L/min into each nostril continuous. Indications: dyspnea       polyethylene glycol (GLYCOLAX) 17 gram/dose powder Take 17 g by mouth daily as needed (constipation). Indications: constipation       sertraline (ZOLOFT) 50 MG tablet Take 50 mg by mouth daily.       white petrolatum (AQUAPHOR ORIGINAL) 41 % Oint Apply 1 application topically 3 (three) times a day as needed.       No current facility-administered medications for this visit.      Functional status: Guilherme resides in a skilled nursing facility.  He remains nonambulatory, nonweightbearing, incontinent bowel and bladder, needs assistance with all turns, toileting etc.  However he is able to speak to some degree and articulate how he feels.  Previous to being on hospice he had been on CPAP but asked for it to be discontinued.  He occasionally still has sleep apnea while asleep and has been known to drop his O2 sats.  He uses oxygen at 2 L, because as he says, 'his wife wants him to'.  He is on a mechanical soft diet with thin liquids, is able to feed himself.    Current symptoms: Guilherme complains of pain in his right knee which radiates to the foot.  He admits to getting short of breath sometimes, but denies having a cough or any dizziness or chest pain. He does fall asleep easily.     Changes in condition:     Review of systems:   Constitutional: Positive for fatigue, negative for weight loss.  Neurological: Negative for seizures or headaches.  Skin: Negative.  Respiratory: Positive for subjective shortness of breath intermittently, using oxygen.  Cardiovascular: Negative for chest pain or dizziness.  Gastrointestinal: Negative.  Musculoskeletal: Positive for muscle weakness, is nonambulatory.  Genitourinary negative.    Physical examination:     VS: /58, HR 70-88-irregular, RR 20, SaO2 90 to 91% on room air, 96% on 2 L right mid arm circumference 27.5 cm (up 1cm from last recert). PPS 30%. Weight: 234# (up slightly from last recert).      General: Elderly man seated in high backed wheelchair at the nursing home, in no distress.  Neurological: Disoriented to time, states he is in [place] in the hospital.  Able to answer other simple questions  HEENT: Atraumatic, neck supple, unremarkable.  Skin: Pale, warm, dry.  Respiratory: Left side diminished posteriorly, inspiratory crackles in right posterior.  Cardiovascular: Irregular S1-S2 with a IV/VI MELITA.   Abdomen: Soft, obese, active bowel sounds.  Extremities: Trace edema bilaterally, unremarkable.      Lashon Abebe, APRN/CNP  [facility]

## 2021-06-02 VITALS — WEIGHT: 233 LBS | BODY MASS INDEX: 32.5 KG/M2

## 2021-06-02 VITALS — WEIGHT: 232 LBS | BODY MASS INDEX: 32.36 KG/M2

## 2021-06-02 VITALS — BODY MASS INDEX: 32.73 KG/M2 | WEIGHT: 234.7 LBS

## 2021-06-02 VITALS — BODY MASS INDEX: 32.36 KG/M2 | WEIGHT: 232 LBS

## 2021-06-02 VITALS — BODY MASS INDEX: 32.75 KG/M2 | WEIGHT: 234.8 LBS

## 2021-06-02 VITALS — HEIGHT: 71 IN | WEIGHT: 246 LBS | BODY MASS INDEX: 34.44 KG/M2

## 2021-06-02 NOTE — PROGRESS NOTES
HOSPICE NP FACE TO FACE VISIT ON 10/9/2019 AT [facility] IN [place], [place] with LARRY DISLA-ORIENTING RN AND JUAN OLSEN-HOSPICE RN CASE MANAGER PRESENT:    Summary: Guilherme Conteh is a  89 y.o.  male  on hospice for a primary diagnosis of acute respiratory failure. The most recent referral to hospice was in August of 2019 following a hospitalization for a tonsillar abscess. I last saw and examined him in the hospital on 8/19/2019.       Significant comorbidities/coexisting conditions include:  Patient Active Problem List   Diagnosis     CAD (coronary artery disease)     Stage 3 chronic kidney disease (H)     Anemia     Hyperlipidemia     COPD (chronic obstructive pulmonary disease) (H)     CHF (congestive heart failure) (H)     Essential hypertension     Venous stasis ulcers of both lower extremities (H)     Pressure ulcer, sacrum     Morbid obesity (H)     Dementia without behavioral disturbance (H)     Sepsis (H)     Acute gangrenous cholecystitis     Upper GI bleed     Aortic stenosis --moderate on Echo 1/2016     Leukocytosis     UGI bleed     Respiratory failure (H)     Hyperkalemia     LÓPEZ (acute kidney injury) (H)     Myoclonic jerking     Macrocytosis     Acute hypercapnic respiratory failure (H)     Pneumonia due to infectious organism, unspecified laterality, unspecified part of lung     NORA (obstructive sleep apnea)     Aspiration pneumonia of left lower lobe due to gastric secretions (H)     Bradycardia     Sore throat     Tonsillar abscess     Past Surgical History:   Procedure Laterality Date     APPENDECTOMY       CORONARY ANGIOPLASTY WITH STENT PLACEMENT  2005     ESOPHAGOGASTRODUODENOSCOPY N/A 8/27/2016    Procedure: ESOPHAGOGASTRODUODENOSCOPY;  Surgeon: Saul Garza MD;  Location: [facility];  Service:      INGUINAL HERNIA REPAIR       LAPAROSCOPIC CHOLECYSTECTOMY N/A 2/24/2016    Procedure: CHOLECYSTECTOMY LAPAROSCOPIC;  Surgeon: Levi Louis MD;   Location: [facility];  Service:      lumbar disc       Current Outpatient Medications   Medication Sig Dispense Refill     acetaminophen (TYLENOL) 500 MG tablet Take 500 mg by mouth every 4 (four) hours as needed for pain.       atropine 1 % ophthalmic solution 2 drops orally or sublingually every 4 hours as needed for secretions. 1.25 mL 0     bisacodyl (DULCOLAX) 10 mg suppository Remove foil and insert 1 suppository daily, rectally, as needed for bowel movement. 1 suppository 0     bumetanide (BUMEX) 1 MG tablet Take 1 mg by mouth 2 (two) times a day at 9am and 6pm.       febuxostat (ULORIC) 40 mg tablet Take 40 mg by mouth daily.       ferrous sulfate 325 (65 FE) MG tablet Take 1 tablet by mouth 2 (two) times a day with meals.        hydrALAZINE (APRESOLINE) 25 MG tablet Take 1 tablet (25 mg total) by mouth every 12 (twelve) hours.  0     hydrocortisone with aloe 1 % Crea cream Apply 1 application topically 4 (four) times a day as needed (itching, redness). Indications: Atopic Dermatitis       HYDROmorphone (DILAUDID) 0.5 MG solutab Place 1 tablet (0.5 mg total) under the tongue every 4 (four) hours as needed for pain or dyspnea (for pain, shortness of breath, or as directed by hospice nurse. May give orally or sublingually.). 5 tablet 0     levETIRAcetam (KEPPRA) 250 MG tablet Take 1 tablet (250 mg total) by mouth 2 (two) times a day.  0     levothyroxine (SYNTHROID, LEVOTHROID) 50 MCG tablet Take 50 mcg by mouth Daily at 6:00 am.       menthol-zinc oxide (CALMOSEPTINE) 0.44-20.6 % Oint ointment Apply 1 application topically as needed (Excoriation). Indications: skin irritation       nitroglycerin (NITROSTAT) 0.4 MG SL tablet Place 0.4 mg under the tongue every 5 (five) minutes as needed for chest pain (as directed needed for pain).       OXYGEN-AIR DELIVERY SYSTEMS MISC 3 L/min into each nostril continuous. Indications: dyspnea       polyethylene glycol (GLYCOLAX) 17 gram/dose powder Take 17 g by  mouth daily.  0     sertraline (ZOLOFT) 50 MG tablet Take 50 mg by mouth daily.       white petrolatum (AQUAPHOR ORIGINAL) 41 % Oint Apply 1 application topically 3 (three) times a day as needed.       No current facility-administered medications for this visit.      Functional status: Guilherme resides in a skilled nursing facility in [place]. He remains non-ambulatory, non- weight bearing, requires a yolanda lift, is incontinent of bowel and bladder and is oxygen dependent. He has sleep apnea but doesn't wear a CPAP mask. Jimy is on continuous oxygen at 2 liters; without it, his oxygen levels drops to the high 80's which happened today when he was tested. Because of dysphagia and difficulty swallowing, he needs a pureed diet. He is usually assisted or fed.  Cognitively, he is a poor historian and has trouble communicating.     Current symptoms:   Leg pain  Difficulty swallowing  Intermittent skin rash/itching    Changes in condition: There has been a small weight loss and 2.5cm loss of muscle mass since Jimy was last seen in the hospital. There is some new skin breakdown on the buttocks.      Review of systems:   Constitutional: Positive for fatigue.   Neurological: Negative for seizures or headaches.   Skin: Positive for new open areas on the bottom.   Respiratory: Positive for oxygen dependence, on 2 liters continuously.   Cardiovascular: Negative for chest pain or dizziness.   Gastrointestinal: Negative except has dysphagia.   Musculoskeletal: Positive for muscle weakness, is nonambulatory.   Genitourinary: Negative except for incontinence.     Physical examination:     VS: /84, HR 64, RR 20, SaO2 87% RA Right mid arm circumference 26cm (down from 28.5cm in August). PPS 30%. Weight:225# (down from 227# in August).     General: Lethargic elderly man of moderate proportion laying in hospital bed at the nursing home, initially asleep.   Neurological: Drowsy, disoriented to time, cooperative, speech  dysarthric, garbled, sl difficult to understand.    HEENT: Atraumatic, normocephalic, neck supple.   Skin: Slightly flushed, warm, dry. Two small 1cm open areas on left buttock, one 1cm spot on right buttock.    Respiratory: Diminished generally, sl coarse in left base.  Cardiovascular: Irregular with a IV/VI MELITA.   Abdomen: Soft, obese, NT, active BTs.   Extremities: Pale, tender to touch, puffy bilaterally.     Based on new open areas of the skin, muscle wasting and general deconditioning with O2 dependence, Jimy is declining, albeit slowly.     Lashon Abebe, APRN/CNP  [facility]

## 2021-06-03 VITALS
RESPIRATION RATE: 20 BRPM | HEART RATE: 63 BPM | SYSTOLIC BLOOD PRESSURE: 178 MMHG | DIASTOLIC BLOOD PRESSURE: 84 MMHG | WEIGHT: 225.6 LBS | BODY MASS INDEX: 28.97 KG/M2

## 2021-06-03 VITALS — WEIGHT: 234.8 LBS | BODY MASS INDEX: 32.75 KG/M2

## 2021-06-03 VITALS — WEIGHT: 227 LBS | BODY MASS INDEX: 29.15 KG/M2

## 2021-06-03 NOTE — PROGRESS NOTES
HOSPICE NP FACE TO FACE VISIT ON 12/4/2019 AT [facility] IN [place], [place]:    Summary: Guilherme Conteh is a  89 y.o.  male  on hospice for a primary diagnosis of acute respiratory failure. The most recent referral to hospice was in August 2019 following a hospitalization for a tonsillar abscess. I last saw and examined him on 10/9/2019.    Significant comorbidities/coexisting conditions include:  Patient Active Problem List   Diagnosis     CAD (coronary artery disease)     Stage 3 chronic kidney disease (H)     Anemia     Hyperlipidemia     COPD (chronic obstructive pulmonary disease) (H)     CHF (congestive heart failure) (H)     Essential hypertension     Venous stasis ulcers of both lower extremities (H)     Pressure ulcer, sacrum     Morbid obesity (H)     Dementia without behavioral disturbance (H)     Sepsis (H)     Acute gangrenous cholecystitis     Upper GI bleed     Aortic stenosis --moderate on Echo 1/2016     Leukocytosis     UGI bleed     Respiratory failure (H)     Hyperkalemia     LÓPEZ (acute kidney injury) (H)     Myoclonic jerking     Macrocytosis     Acute hypercapnic respiratory failure (H)     Pneumonia due to infectious organism, unspecified laterality, unspecified part of lung     NORA (obstructive sleep apnea)     Aspiration pneumonia of left lower lobe due to gastric secretions (H)     Bradycardia     Sore throat     Tonsillar abscess     Past Surgical History:   Procedure Laterality Date     APPENDECTOMY       CORONARY ANGIOPLASTY WITH STENT PLACEMENT  2005     ESOPHAGOGASTRODUODENOSCOPY N/A 8/27/2016    Procedure: ESOPHAGOGASTRODUODENOSCOPY;  Surgeon: Saul Garza MD;  Location: [facility] GI;  Service:      INGUINAL HERNIA REPAIR       LAPAROSCOPIC CHOLECYSTECTOMY N/A 2/24/2016    Procedure: CHOLECYSTECTOMY LAPAROSCOPIC;  Surgeon: Levi Louis MD;  Location: [facility] OR;  Service:      lumbar disc       Current Outpatient Medications   Medication  Sig Dispense Refill     acetaminophen (TYLENOL) 500 MG tablet Take 500 mg by mouth every 4 (four) hours as needed for pain.       atropine 1 % ophthalmic solution 2 drops orally or sublingually every 4 hours as needed for secretions. 1.25 mL 0     bisacodyl (DULCOLAX) 10 mg suppository Remove foil and insert 1 suppository daily, rectally, as needed for bowel movement. 1 suppository 0     bumetanide (BUMEX) 1 MG tablet Take 1 mg by mouth 2 (two) times a day at 9am and 6pm.       febuxostat (ULORIC) 40 mg tablet Take 40 mg by mouth daily.       ferrous sulfate 325 (65 FE) MG tablet Take 1 tablet by mouth 2 (two) times a day with meals.        hydrALAZINE (APRESOLINE) 25 MG tablet Take 1 tablet (25 mg total) by mouth every 12 (twelve) hours.  0     hydrocortisone with aloe 1 % Crea cream Apply 1 application topically 4 (four) times a day as needed (itching, redness). Indications: Atopic Dermatitis       HYDROmorphone (DILAUDID) 0.5 MG solutab Place 1 tablet (0.5 mg total) under the tongue every 4 (four) hours as needed for pain or dyspnea (for pain, shortness of breath, or as directed by hospice nurse. May give orally or sublingually.). 5 tablet 0     levETIRAcetam (KEPPRA) 250 MG tablet Take 1 tablet (250 mg total) by mouth 2 (two) times a day.  0     levothyroxine (SYNTHROID, LEVOTHROID) 50 MCG tablet Take 50 mcg by mouth Daily at 6:00 am.       miconazole (ZEASORB, MICONAZOLE,) 2 % powder Apply 1 application topically 2 (two) times a day. and prn with brief changes.   T.O. Dr. MOJGAN Caballero/ OLI Rose RN  Indications: a skin infection due to the fungus Candida       nitroglycerin (NITROSTAT) 0.4 MG SL tablet Place 0.4 mg under the tongue every 5 (five) minutes as needed for chest pain (as directed needed for pain).       OXYGEN-AIR DELIVERY SYSTEMS MISC 1-4 L/min into each nostril continuous. Indications: dyspnea       polyethylene glycol (GLYCOLAX) 17 gram/dose powder Take 17 g by mouth every other day. T.O. Dr. M.  "Ada/ OLI Rose RN  Indications: constipation       sertraline (ZOLOFT) 50 MG tablet Take 50 mg by mouth daily.       white petrolatum (AQUAPHOR ORIGINAL) 41 % Oint Apply 1 application topically 3 (three) times a day as needed.       No current facility-administered medications for this visit.      Functional status: Guilherme resides in a skilled nursing facility in [place].  He remains nonambulatory, nonweightbearing, requires a Judy lift, is incontinent of bowel and bladder, needs to be fed, etc. Sarai on continuous oxygen at 2 L; however today with the oxygen off his SaO2 maintained above 90%.  Cognitively, he is unable to articulate his needs, although he can answer some questions appropriately.  He states his appetite is \"not very good\".  He is on a puréed diet and is usually assisted or fed can has sleep apnea but he does not wear CPAP mask.    Current symptoms: Staff report Jimy is drowsy and often sleeps in his Broda chair when he is up.  Otherwise he has been free of infections and symptoms appear to be fairly well-controlled.    Review of systems:   Constitutional: Positive for fatigue.  Neurological: Negative for seizures.  Skin: Negative for rashes, bruises or pressure ulcers.  Respiratory: Positive for oxygen use, sleep apnea.  Cardiovascular: Positive history of aortic stenosis.  Otherwise negative for subjective chest pain.  Gastrointestinal: Positive for poor appetite.  Musculoskeletal: Positive for muscle weakness, is nonambulatory.  Genitourinary negative.    Physical examination:     VS: /64, HR 70, RR 20, SaO2 91% RA right mid arm circumference 29 cm (up from last recert.) PPS 30%. Weight: 227# (up 2# from last recert visit).     General: Elderly man who appears his reported age seated in a Broda chair at the nursing home, oxygen on at 2.5 L.  He denies pain.  Neurological: Drowsy, falls asleep easily, cooperative, answers simple questions.  HEENT: Atraumatic, normocephalic, " neck supple.  Skin: Slightly kellie, warm and dry.  Respiratory: Diminished generally, faint inspiratory crackles heard in the bases posteriorly.  Cardiovascular: Regular S1-S2 with a IV/VI MELITA.  Abdomen: Soft, obese, active bowel sounds.  Extremities: Mild edema noted, otherwise unremarkable.      Lashon Abebe, APRN/CNP  [facility]

## 2021-06-04 VITALS
TEMPERATURE: 96.2 F | DIASTOLIC BLOOD PRESSURE: 77 MMHG | OXYGEN SATURATION: 98 % | WEIGHT: 226 LBS | SYSTOLIC BLOOD PRESSURE: 131 MMHG | HEART RATE: 83 BPM | BODY MASS INDEX: 29.02 KG/M2 | RESPIRATION RATE: 18 BRPM

## 2021-06-04 VITALS
WEIGHT: 227.5 LBS | RESPIRATION RATE: 18 BRPM | HEART RATE: 68 BPM | OXYGEN SATURATION: 98 % | BODY MASS INDEX: 29.21 KG/M2

## 2021-06-04 NOTE — PROGRESS NOTES
[facility]    Facility:   [facility] [[id number]]   Code Status: POLST AVAILABLE    Admission evaluation to long-term care of 89-year-old male on transfer from alternate physician. 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    Past medical history, current medical problem list, drug allergies, current medication list, social history, family history, drug allergies, code status reviewed in epic.    Review of systems: dyspnea with activity. Prefers to be in bed. Denies current pain. Not ambulatory. Denies orthopnea or PND. Unaware of palpitations. No syncopal episodes. Requires supplemental 02 becoming dyspneic and hypoxic with activity. Unaware of seizure activity. At times irritable by his report, typically mood stable. No active G.I. or  symptoms. Remainder of 12 point review of systems obtained negative.    Exam: vital signs reviewed over past one month. Alert, oriented to person, somewhat to place and not to time, lying supine in bed in no apparent distress, initially sleeping, awakens with ease, highly congenial and cooperative. Crowded oral pharynx. No facial asymmetry. No credit bruits or HJR. Thyroid without nodularity or tenderness. S1 and S2 regular with 2/6 systolic murmur. Pulmonary exam with shallow inspiratory effort, no rales rhonchi or wheezes. Abdomen without masses tenderness organomegaly. Modest DJD changes digital joints and knees without acute inflammatory change. No calf tenderness or swelling. Venous stasis changes bilateral distal lower extremities, no skin breakdown. Pedal pulses -2 and symmetrical. Strength and muscle  tone diffusely diminished.    Impression and plan:   memory deficit without behavioral abnormalities.   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   Aortic stenosis without associated clinical symptoms.   Obstructive sleep apnea, CPAP use.   History of nephrolithiasis and hyperuricemia on uloric, no recent  stones.   Congestive heart failure compensated on Bumex.   Coronary artery disease with previous history of angioplasty and stent, no current anginal symptoms.   Hypertension on hydralazine b.i.d.   Hypothyroidism on replacement.   Mild depressive symptoms on Zoloft. mood stable.   Venous insufficiency lower extremities without skin breakdown.   Morbid obesity and deconditioning, non-ambulatory.   DJD of multiple joints without current pain, Dilaudid is available PRN.   Multiple medical issues are reviewed, previous medical notes reviewed, patient concerns reviewed, discussion with nursing staff report patient intermittently irritable, clinically stable.      Electronically signed by: Cary Quesada MD

## 2021-06-04 NOTE — PROGRESS NOTES
[facility]    Facility:   [facility] [[id number]]   Code Status: DNR/DNI and POLST AVAILABLE      CHIEF COMPLAINT/REASON FOR VISIT:  Chief Complaint   Patient presents with     Problem Visit     need for O2       HISTORY:      HPI: Guilherme is a 89 y.o. male who  has a past medical history of Anemia (1/2/2016), Asthma, CAD (coronary artery disease) (1/2/2016), CHF (congestive heart failure) (H), CKD (chronic kidney disease), stage 3 (moderate) (1/2/2016), Cognitive impairment, COPD (chronic obstructive pulmonary disease) (H), Essential hypertension (1/2/2016), Hyperlipidemia, Morbid obesity (H), Nephrolithiasis, Osteoarthritis, Pressure ulcer, sacrum, Sleep apnea, Tonsillar abscess (8/18/2019), and Venous stasis ulcers of both lower extremities (H). He also has no past medical history of History of anesthesia complications. Jimy is a long term care patient at [facility].     Today Jimy is being evaluated for a face to face for oxygen. Jimy has significant history of chronic respiratory failure, COPD, CHF and has a significant need for oxygen. He does not maintain oxygen saturation on room air. He gets short of breath and becomes hypoxic. Jimy does not have any acute concerns or issues to report. He is otherwise feeling well. Jimy denies any other concerns including fevers/chills, cough or cold symptoms, headaches, vision changes, chest pain/pressure, difficulty breathing, SOB, abdominal pain, nausea, vomiting, diarrhea, dysuria, increasing weakness, increasing pain.       Past Medical History:   Diagnosis Date     Anemia 1/2/2016     Asthma      CAD (coronary artery disease) 1/2/2016    stent 2005     CHF (congestive heart failure) (H)      CKD (chronic kidney disease), stage 3 (moderate) 1/2/2016     Cognitive impairment     SLUMS 9/30 acutely     COPD (chronic obstructive pulmonary disease) (H)      Essential hypertension 1/2/2016      Hyperlipidemia      Morbid obesity (H)      Nephrolithiasis      Osteoarthritis      Pressure ulcer, sacrum      Sleep apnea      Tonsillar abscess 8/18/2019     Venous stasis ulcers of both lower extremities (H)              Family History   Problem Relation Age of Onset     Cancer Sister      Alcohol abuse Brother      Social History     Socioeconomic History     Marital status:      Spouse name: None     Number of children: None     Years of education: 2     Highest education level: None   Occupational History     Occupation: Retired    Social Needs     Financial resource strain: None     Food insecurity:     Worry: None     Inability: None     Transportation needs:     Medical: None     Non-medical: None   Tobacco Use     Smoking status: Former Smoker     Years: 10.00     Types: Cigars     Tobacco comment: Quit smoking in 1959   Substance and Sexual Activity     Alcohol use: No     Drug use: No     Sexual activity: None   Lifestyle     Physical activity:     Days per week: None     Minutes per session: None     Stress: None   Relationships     Social connections:     Talks on phone: None     Gets together: None     Attends Scientologist service: None     Active member of club or organization: None     Attends meetings of clubs or organizations: None     Relationship status: None     Intimate partner violence:     Fear of current or ex partner: None     Emotionally abused: None     Physically abused: None     Forced sexual activity: None   Other Topics Concern     Incontinent Not Asked     Toileting independently Not Asked     Cognitive impairment Not Asked     Vision impairment Not Asked     Hearing impairment Not Asked     Dentures Not Asked   Social History Narrative    , 2 children, retired , he lives in [place] [facility] and is DNR.  (last updated 8/27/2016)        REVIEW OF SYSTEM:  Per HPI    PHYSICAL EXAM:   /77   Pulse 83    Temp (!) 96.2  F (35.7  C)   Resp 18   Wt (!) 226 lb (102.5 kg)   SpO2 98%   BMI 29.02 kg/m    General appearance: alert, appears stated age and cooperative  HEENT: Head is normocephalic with normal hair distribution. No evidence of trauma. Ears: Without lesions or deformity. No acute purulent discharge. Eyes: Conjunctivae pink with no scleral icterus or erythema. Nose: Normal mucosa and septum. Oropharnyx: mmm, no lesions present.  Lungs: clear to auscultation bilaterally, respirations without effort  Heart: regular rate and rhythm, S1, S2 normal, 3/6 systolic murmur  Abdomen: soft, non-tender; bowel sounds normal; no masses,  no organomegaly  Extremities: extremities normal, atraumatic  Pulses: 2+ and symmetric  Skin: Skin color, texture, turgor normal.   Neurologic: Grossly normal   Psych: interacts well with caregivers, exhibits logical thought processes and connections--although has history of cognitive decline, pleasant      LABS:   None today.     ASSESSMENT:      ICD-10-CM    1. Chronic obstructive pulmonary disease, unspecified COPD type (H) J44.9    2. Congestive heart failure, unspecified HF chronicity, unspecified heart failure type (H) I50.9    3. Acute on chronic respiratory failure with hypoxia and hypercapnia (H) J96.21     J96.22        PLAN:    COPD/CHF/RF  -DME Order  Oxygen    Oxygen saturation at rest on room air: 80%    If above saturation is <88%, the below saturations do not need to be completed.  Oxygen saturation with activity on room air: less than 85%  Oxygen saturation with activity while on Oxygen: 98% on 1-4 LPM depending on exertion level    If ordering >4 LPM: oxygen saturation on 4L: 98%    Electronically signed by: Sabrina Cannon CNP NPI [id number]

## 2021-06-05 NOTE — PROGRESS NOTES
[facility]    Facility:   [facility] [[id number]]   Code Status: POLST AVAILABLE    89-year-old of long-term care resident is seen for review of multiple medical problems. Previously on hospice, off hospice at this time. 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    Review of systems: denies dyspnea, states he does not always use oxygen, uses oxygen with he desires however it frequently falls off. No sense of dyspnea. No chest pain or palpitations. No fever sweats or chills. Denies pain. Unaware of memory deficit. No orthopnea or PND. Remainder of 12 point review of systems obtained negative.    Exam: vital signs reviewed over past three weeks. Pleasant male lying supine in bed, supplemental 02 in one nostril, no use of accessory muscles at rest, no evidence of dyspnea. Crowded oral pharynx. Minimally dysarthric speech. S1 and S2 regular with 1/6 systolic murmur. Pulmonary exam with shallow inspiratory effort, no rales rhonchi or wheezes. Abdomen without masses or tenderness. DJD changes bilateral knees without acute inflammatory change or tenderness. No calf tenderness or swelling. Nonpitting edema 1 mm bilateral lower extremities.    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   Mild memory deficit without behavioral abnormalities.   Coronary artery disease without current angina, previous stent and angioplasty.   Aortic stenosis clinically stable.   Congestive heart failure compensated on Bumex.   Medications reviewed, clinically stable, nursing without current concerns.      Electronically signed by: Cary Quesada MD

## 2021-06-16 PROBLEM — J02.9 SORE THROAT: Status: ACTIVE | Noted: 2019-01-01

## 2021-06-16 PROBLEM — R41.82 ALTERED MENTAL STATUS: Status: ACTIVE | Noted: 2020-01-01

## 2021-06-16 PROBLEM — G25.3 MYOCLONIC JERKING: Status: ACTIVE | Noted: 2018-10-27

## 2021-06-16 PROBLEM — D75.89 MACROCYTOSIS: Status: ACTIVE | Noted: 2018-10-27

## 2021-06-16 PROBLEM — J36 TONSILLAR ABSCESS: Status: ACTIVE | Noted: 2019-01-01

## 2021-06-16 PROBLEM — J96.90 RESPIRATORY FAILURE (H): Status: ACTIVE | Noted: 2018-10-26

## 2021-06-18 ENCOUNTER — RECORDS - HEALTHEAST (OUTPATIENT)
Dept: ADMINISTRATIVE | Facility: CLINIC | Age: 86
End: 2021-06-18

## 2021-06-20 NOTE — LETTER
Letter by Sabrina Cannon CNP at      Author: Sabrina Cannon CNP Service: -- Author Type: --    Filed:  Encounter Date: 12/16/2019 Status: Signed         Patient: Guilherme Conteh   MR Number: [id number]   YOB: 1927   Date of Visit: 12/16/2019     [facility]    Facility:   [facility] [[id number]]   Code Status: DNR/DNI and POLST AVAILABLE      CHIEF COMPLAINT/REASON FOR VISIT:  Chief Complaint   Patient presents with   ? Problem Visit     need for O2       HISTORY:      HPI: Guilherme is a 89 y.o. male who  has a past medical history of Anemia (1/2/2016), Asthma, CAD (coronary artery disease) (1/2/2016), CHF (congestive heart failure) (H), CKD (chronic kidney disease), stage 3 (moderate) (1/2/2016), Cognitive impairment, COPD (chronic obstructive pulmonary disease) (H), Essential hypertension (1/2/2016), Hyperlipidemia, Morbid obesity (H), Nephrolithiasis, Osteoarthritis, Pressure ulcer, sacrum, Sleep apnea, Tonsillar abscess (8/18/2019), and Venous stasis ulcers of both lower extremities (H). He also has no past medical history of History of anesthesia complications. Jimy is a long term care patient at [facility].     Today Jimy is being evaluated for a face to face for oxygen. Jimy has significant history of chronic respiratory failure, COPD, CHF and has a significant need for oxygen. He does not maintain oxygen saturation on room air. He gets short of breath and becomes hypoxic. Jimy does not have any acute concerns or issues to report. He is otherwise feeling well. Jimy denies any other concerns including fevers/chills, cough or cold symptoms, headaches, vision changes, chest pain/pressure, difficulty breathing, SOB, abdominal pain, nausea, vomiting, diarrhea, dysuria, increasing weakness, increasing pain.       Past Medical History:   Diagnosis Date   ? Anemia 1/2/2016   ? Asthma    ? CAD (coronary artery disease) 1/2/2016     stent 2005   ? CHF (congestive heart failure) (H)    ? CKD (chronic kidney disease), stage 3 (moderate) 1/2/2016   ? Cognitive impairment     SLUMS 9/30 acutely   ? COPD (chronic obstructive pulmonary disease) (H)    ? Essential hypertension 1/2/2016   ? Hyperlipidemia    ? Morbid obesity (H)    ? Nephrolithiasis    ? Osteoarthritis    ? Pressure ulcer, sacrum    ? Sleep apnea    ? Tonsillar abscess 8/18/2019   ? Venous stasis ulcers of both lower extremities (H)              Family History   Problem Relation Age of Onset   ? Cancer Sister    ? Alcohol abuse Brother      Social History     Socioeconomic History   ? Marital status:      Spouse name: None   ? Number of children: None   ? Years of education: 2   ? Highest education level: None   Occupational History   ? Occupation: Retired    Social Needs   ? Financial resource strain: None   ? Food insecurity:     Worry: None     Inability: None   ? Transportation needs:     Medical: None     Non-medical: None   Tobacco Use   ? Smoking status: Former Smoker     Years: 10.00     Types: Cigars   ? Tobacco comment: Quit smoking in 1959   Substance and Sexual Activity   ? Alcohol use: No   ? Drug use: No   ? Sexual activity: None   Lifestyle   ? Physical activity:     Days per week: None     Minutes per session: None   ? Stress: None   Relationships   ? Social connections:     Talks on phone: None     Gets together: None     Attends Zoroastrianism service: None     Active member of club or organization: None     Attends meetings of clubs or organizations: None     Relationship status: None   ? Intimate partner violence:     Fear of current or ex partner: None     Emotionally abused: None     Physically abused: None     Forced sexual activity: None   Other Topics Concern   ? Incontinent Not Asked   ? Toileting independently Not Asked   ? Cognitive impairment Not Asked   ? Vision impairment Not Asked   ? Hearing impairment Not Asked   ? Dentures Not Asked    Social History Narrative    , 2 children, retired , he lives in a Nursing [place] [facility] [place] and is DNR.  (last updated 8/27/2016)        REVIEW OF SYSTEM:  Per HPI    PHYSICAL EXAM:   /77   Pulse 83   Temp (!) 96.2  F (35.7  C)   Resp 18   Wt (!) 226 lb (102.5 kg)   SpO2 98%   BMI 29.02 kg/m     General appearance: alert, appears stated age and cooperative  HEENT: Head is normocephalic with normal hair distribution. No evidence of trauma. Ears: Without lesions or deformity. No acute purulent discharge. Eyes: Conjunctivae pink with no scleral icterus or erythema. Nose: Normal mucosa and septum. Oropharnyx: mmm, no lesions present.  Lungs: clear to auscultation bilaterally, respirations without effort  Heart: regular rate and rhythm, S1, S2 normal, 3/6 systolic murmur  Abdomen: soft, non-tender; bowel sounds normal; no masses,  no organomegaly  Extremities: extremities normal, atraumatic  Pulses: 2+ and symmetric  Skin: Skin color, texture, turgor normal.   Neurologic: Grossly normal   Psych: interacts well with caregivers, exhibits logical thought processes and connections--although has history of cognitive decline, pleasant      LABS:   None today.     ASSESSMENT:      ICD-10-CM    1. Chronic obstructive pulmonary disease, unspecified COPD type (H) J44.9    2. Congestive heart failure, unspecified HF chronicity, unspecified heart failure type (H) I50.9    3. Acute on chronic respiratory failure with hypoxia and hypercapnia (H) J96.21     J96.22        PLAN:    COPD/CHF/RF  -DME Order  Oxygen    Oxygen saturation at rest on room air: 80%    If above saturation is <88%, the below saturations do not need to be completed.  Oxygen saturation with activity on room air: less than 85%  Oxygen saturation with activity while on Oxygen: 98% on 1-4 LPM depending on exertion level    If ordering >4 LPM: oxygen saturation on 4L: 98%    Electronically signed by: Sabrina  TANNER Cannon NPI [id number]

## 2021-06-20 NOTE — LETTER
Letter by Cary Quesada MD at      Author: Cary Quesada MD Service: -- Author Type: --    Filed:  Encounter Date: 1/13/2020 Status: Signed         Patient: Guilherme Conteh   MR Number: [id number]   YOB: 1927   Date of Visit: 1/13/2020     [facility]    Facility:   [facility] [[id number]]   Code Status: POLST AVAILABLE    89-year-old of long-term care resident is seen for review of multiple medical problems. Previously on hospice, off hospice at this time. 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    Review of systems: denies dyspnea, states he does not always use oxygen, uses oxygen with he desires however it frequently falls off. No sense of dyspnea. No chest pain or palpitations. No fever sweats or chills. Denies pain. Unaware of memory deficit. No orthopnea or PND. Remainder of 12 point review of systems obtained negative.    Exam: vital signs reviewed over past three weeks. Pleasant male lying supine in bed, supplemental 02 in one nostril, no use of accessory muscles at rest, no evidence of dyspnea. Crowded oral pharynx. Minimally dysarthric speech. S1 and S2 regular with 1/6 systolic murmur. Pulmonary exam with shallow inspiratory effort, no rales rhonchi or wheezes. Abdomen without masses or tenderness. DJD changes bilateral knees without acute inflammatory change or tenderness. No calf tenderness or swelling. Nonpitting edema 1 mm bilateral lower extremities.    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   Mild memory deficit without behavioral abnormalities.   Coronary artery disease without current angina, previous stent and angioplasty.   Aortic stenosis clinically stable.   Congestive heart failure compensated on Bumex.   Medications reviewed, clinically stable,  nursing without current concerns.      Electronically signed by: Cary Quesada MD

## 2021-06-20 NOTE — LETTER
Letter by Cary Quesada MD at      Author: Cary Quesada MD Service: -- Author Type: --    Filed:  Encounter Date: 12/30/2019 Status: Signed         Patient: Guilherme Conteh   MR Number: [id number]   YOB: 1927   Date of Visit: 12/30/2019     [facility]    Facility:   [facility] [[id number]]   Code Status: POLST AVAILABLE    Admission evaluation to long-term care of 89-year-old male on transfer from alternate physician. 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    Past medical history, current medical problem list, drug allergies, current medication list, social history, family history, drug allergies, code status reviewed in epic.    Review of systems: dyspnea with activity. Prefers to be in bed. Denies current pain. Not ambulatory. Denies orthopnea or PND. Unaware of palpitations. No syncopal episodes. Requires supplemental 02 becoming dyspneic and hypoxic with activity. Unaware of seizure activity. At times irritable by his report, typically mood stable. No active G.I. or  symptoms. Remainder of 12 point review of systems obtained negative.    Exam: vital signs reviewed over past one month. Alert, oriented to person, somewhat to place and not to time, lying supine in bed in no apparent distress, initially sleeping, awakens with ease, highly congenial and cooperative. Crowded oral pharynx. No facial asymmetry. No credit bruits or HJR. Thyroid without nodularity or tenderness. S1 and S2 regular with 2/6 systolic murmur. Pulmonary exam with shallow inspiratory effort, no rales rhonchi or wheezes. Abdomen without  masses tenderness organomegaly. Modest DJD changes digital joints and knees without acute inflammatory change. No calf tenderness or swelling. Venous stasis changes bilateral distal lower extremities, no skin breakdown. Pedal pulses -2 and symmetrical. Strength and muscle tone diffusely diminished.    Impression and plan:   memory deficit without behavioral abnormalities.   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   Aortic stenosis without associated clinical symptoms.   Obstructive sleep apnea, CPAP use.   History of nephrolithiasis and hyperuricemia on uloric, no recent  stones.   Congestive heart failure compensated on Bumex.   Coronary artery disease with previous history of angioplasty and stent, no current anginal symptoms.   Hypertension on hydralazine b.i.d.   Hypothyroidism on replacement.   Mild depressive symptoms on Zoloft. mood stable.   Venous insufficiency lower extremities without skin breakdown.   Morbid obesity and deconditioning, non-ambulatory.   DJD of multiple joints without current pain, Dilaudid is available PRN.   Multiple medical issues are reviewed, previous medical notes reviewed, patient concerns reviewed, discussion with nursing staff report patient intermittently irritable, clinically stable.      Electronically signed by: Cary Quesada MD

## 2021-06-21 NOTE — PROGRESS NOTES
[facility] has been contacted by Gaurav in the RT department to evaluate the patient for home Bipap/NIV.  He did make us aware that the patient lives in a skilled nursing facility named [facility].  I reviewed the pts medical records and found him to be a candidate for home Bipap and or NIV.  Due to his SNF home status he would qualify for Bipap/NIV as an inpt without any required sleep study.  I have spoke with Adeline Ambrocio, the admissions coordinator at [facility] to offer our DME services.  Currently they are using [facility], but possibly may be open to allowing [facility] to care for this pt.  I have relayed this information to the RT Gaurav and he provided me with Stormy the care coordinator for Mr. Conteh contact information.  I spoke with Stormy to relay the conversation I had with [facility] also.  [facility] will continue to monitor the pt's needs for discharge.  Terrie Crenshaw RRT  [facility]  [phone]